# Patient Record
Sex: MALE
[De-identification: names, ages, dates, MRNs, and addresses within clinical notes are randomized per-mention and may not be internally consistent; named-entity substitution may affect disease eponyms.]

---

## 2021-02-23 ENCOUNTER — DASHBOARD ENCOUNTERS (OUTPATIENT)
Age: 21
End: 2021-02-23

## 2021-02-23 ENCOUNTER — OFFICE VISIT (OUTPATIENT)
Dept: URBAN - NONMETROPOLITAN AREA CLINIC 2 | Facility: CLINIC | Age: 21
End: 2021-02-23
Payer: COMMERCIAL

## 2021-02-23 VITALS
WEIGHT: 260 LBS | HEIGHT: 72 IN | SYSTOLIC BLOOD PRESSURE: 158 MMHG | DIASTOLIC BLOOD PRESSURE: 101 MMHG | BODY MASS INDEX: 35.21 KG/M2 | HEART RATE: 72 BPM

## 2021-02-23 DIAGNOSIS — R14.0 ABDOMINAL BLOATING: ICD-10-CM

## 2021-02-23 DIAGNOSIS — K92.0 HEMATEMESIS WITH NAUSEA: ICD-10-CM

## 2021-02-23 DIAGNOSIS — F12.90 MARIJUANA USE: ICD-10-CM

## 2021-02-23 DIAGNOSIS — K92.1 HEMATOCHEZIA: ICD-10-CM

## 2021-02-23 PROBLEM — 733460004: Status: ACTIVE | Noted: 2021-02-23

## 2021-02-23 PROBLEM — 8765009: Status: ACTIVE | Noted: 2021-02-23

## 2021-02-23 PROBLEM — 116289008: Status: ACTIVE | Noted: 2021-02-23

## 2021-02-23 PROCEDURE — 99204 OFFICE O/P NEW MOD 45 MIN: CPT | Performed by: INTERNAL MEDICINE

## 2021-02-23 RX ORDER — CARVEDILOL 12.5 MG/1
TABLET, FILM COATED ORAL
Qty: 60 DELAYED RELEASE TABLET | Status: ACTIVE | COMMUNITY

## 2021-02-23 RX ORDER — FLUOXETINE HYDROCHLORIDE 20 MG/1
1 CAPSULE CAPSULE ORAL ONCE A DAY
Status: ACTIVE | COMMUNITY

## 2021-02-23 NOTE — PHYSICAL EXAM GASTROINTESTINAL
Central adiposity appreciated,soft,non-tender,non-distended,no rebound or guarding,normoactive bowel sounds

## 2021-02-23 NOTE — HPI-TODAY'S VISIT:
20 year old gentleman with past medical history of anxiety/depression, hypertension, marijuana use who presents for evaluation of hematemesis versus hemoptysis as well as hematochezia.  Mr. Jarrell acknowledges smoking approximately 1 gram of marijuana per day. He has experienced times where he would smoke marijuana and develop an episode of coughing lead to bright red blood. He has also noted that when he is nauseous and vomits he can experience bright red blood at the end of the emesis.   He does experience lower abdominal cramping/bloating which can improve with passage of a bowel movement. He experiences epigastric discomfort/bloating after the consumption of spicy foods. When he does not eat the spicy foods he does not experience the upper abdominal discomfort.   He has three bowel movements per day. Denies urgency. Denies nocturnal symptoms. He has experienced bright red blood in the stool.   He currently works and works with [employer].

## 2021-02-23 NOTE — PHYSICAL EXAM HENT:
Head,  normocephalic,  atraumatic,  Face,  Face within normal limits,  Ears,  External ears within normal limits,  Nose/Nasopharynx,  External nose  normal appearance,  nares patent,  no nasal discharge,  Mouth and Throat,  Oral cavity appearance normal,Lips,  Appearance normal

## 2021-02-24 ENCOUNTER — TELEPHONE ENCOUNTER (OUTPATIENT)
Dept: URBAN - NONMETROPOLITAN AREA CLINIC 2 | Facility: CLINIC | Age: 21
End: 2021-02-24

## 2021-02-24 LAB
A/G RATIO: 1.5
ALBUMIN: 4.6
ALKALINE PHOSPHATASE: 111
ALT (SGPT): 38
AST (SGOT): 23
BASO (ABSOLUTE): (no result)
BASOS: (no result)
BILIRUBIN, TOTAL: 0.7
BUN/CREATININE RATIO: 15
BUN: 17
C-REACTIVE PROTEIN, QUANT: 3
CALCIUM: 9.6
CARBON DIOXIDE, TOTAL: 21
CHLORIDE: 103
CREATININE: 1.17
EGFR IF AFRICN AM: 102
EGFR IF NONAFRICN AM: 89
ENDOMYSIAL ANTIBODY IGA: NEGATIVE
EOS (ABSOLUTE): (no result)
EOS: (no result)
GLOBULIN, TOTAL: 3
GLUCOSE: 94
HEMATOCRIT: (no result)
HEMATOLOGY COMMENTS:: (no result)
HEMOGLOBIN: (no result)
IMMATURE CELLS: (no result)
IMMATURE GRANS (ABS): (no result)
IMMATURE GRANULOCYTES: (no result)
IMMUNOGLOBULIN A, QN, SERUM: 152
LYMPHS (ABSOLUTE): (no result)
LYMPHS: (no result)
MCH: (no result)
MCHC: (no result)
MCV: (no result)
MONOCYTES(ABSOLUTE): (no result)
MONOCYTES: (no result)
NEUTROPHILS (ABSOLUTE): (no result)
NEUTROPHILS: (no result)
NRBC: (no result)
PLATELETS: (no result)
POTASSIUM: 4.5
PROTEIN, TOTAL: 7.6
RBC: (no result)
RDW: (no result)
REQUEST PROBLEM: (no result)
SODIUM: 141
T-TRANSGLUTAMINASE (TTG) IGA: <2
TSH: 1.2
WBC: (no result)

## 2021-02-25 ENCOUNTER — TELEPHONE ENCOUNTER (OUTPATIENT)
Dept: URBAN - NONMETROPOLITAN AREA CLINIC 2 | Facility: CLINIC | Age: 21
End: 2021-02-25

## 2021-03-29 PROBLEM — 449341000124102: Status: ACTIVE | Noted: 2021-02-23

## 2021-04-01 ENCOUNTER — OFFICE VISIT (OUTPATIENT)
Dept: URBAN - NONMETROPOLITAN AREA SURGERY CENTER 1 | Facility: SURGERY CENTER | Age: 21
End: 2021-04-01
Payer: COMMERCIAL

## 2021-04-01 DIAGNOSIS — K31.89 ACQUIRED DEFORMITY OF DUODENUM: ICD-10-CM

## 2021-04-01 DIAGNOSIS — K21.9 ACID REFLUX: ICD-10-CM

## 2021-04-01 DIAGNOSIS — K29.60 ADENOPAPILLOMATOSIS GASTRICA: ICD-10-CM

## 2021-04-01 DIAGNOSIS — K29.80 ACUTE DUODENITIS: ICD-10-CM

## 2021-04-01 DIAGNOSIS — K92.1 BLACK STOOL: ICD-10-CM

## 2021-04-01 PROCEDURE — G8907 PT DOC NO EVENTS ON DISCHARG: HCPCS | Performed by: INTERNAL MEDICINE

## 2021-04-01 PROCEDURE — 43239 EGD BIOPSY SINGLE/MULTIPLE: CPT | Performed by: INTERNAL MEDICINE

## 2021-04-01 PROCEDURE — 45378 DIAGNOSTIC COLONOSCOPY: CPT | Performed by: INTERNAL MEDICINE

## 2021-04-27 ENCOUNTER — OFFICE VISIT (OUTPATIENT)
Dept: URBAN - NONMETROPOLITAN AREA CLINIC 2 | Facility: CLINIC | Age: 21
End: 2021-04-27

## 2021-04-27 NOTE — HPI-TODAY'S VISIT:
2/3/2021: Initial Gastroenterology Clinic Visit  20 year old gentleman with past medical history of anxiety/depression, hypertension, marijuana use who presents for evaluation of hematemesis versus hemoptysis as well as hematochezia.  Mr. Jarrell acknowledges smoking approximately 1 gram of marijuana per day. He has experienced times where he would smoke marijuana and develop an episode of coughing lead to bright red blood. He has also noted that when he is nauseous and vomits he can experience bright red blood at the end of the emesis.   He does experience lower abdominal cramping/bloating which can improve with passage of a bowel movement. He experiences epigastric discomfort/bloating after the consumption of spicy foods. When he does not eat the spicy foods he does not experience the upper abdominal discomfort.   He has three bowel movements per day. Denies urgency. Denies nocturnal symptoms. He has experienced bright red blood in the stool.   He currently works and works with [employer].  2/23/2021: Chemistry panel, LFTs normal. CRP normal. Celiac serologies negative for celiac disease. TSH normal.  4/1/2021: EGD Ringed esophagus. Biopsies of the proximal and distal esophagus performed. Erythematous mucosa without bleeding was found in the gastric antrum. Biopsied.  Localized erythematous, edematous mucosa was found in the gastric fundus. Biopsied. The examine duodenum was normal. Biopsied.  4/1/2021: Pathology from EGD Final Pathologic Diagnosis A Duodenum, second part, biopsy Chronic duodenitis. No histologic evidence of celiac sprue or infectious microorganisms. B Stomach, antrum, biopsy Reactive gastropathy. No Helicobacter pylori microorganisms are identified with a special stain. C Stomach, fundus, biopsy Mild chronic gastritis. No Helicobacter pylori microorganisms are identified with a special stain. D Distal esophagus, biopsy Squamous epithelium with increased intraepithelial eosinophils (maximum count of 8 in a single microscopic high power field), see comment. An Alcian blue/PAS stain is negative for both intestinal metaplasia and fungal elements. E Proximal esophagus, biopsy Squamous epithelium with increased intraepithelial eosinophils (maximum count of 8 in a single microscopic high power field), see comment. An Alcian blue/PAS stain is negative for both intestinal metaplasia and fungal elements. 4/1/2021: Colonoscopy  The perianal and digital rectal examinations were normal. The terminal ileum was normal. The colon was normal. Internal hemorrhoids were found during retroflexion.  Plan: - BID PPI. - Helicobacter pylori testing.  - Stop marijuana.